# Patient Record
Sex: MALE | Race: OTHER | Employment: FULL TIME | URBAN - METROPOLITAN AREA
[De-identification: names, ages, dates, MRNs, and addresses within clinical notes are randomized per-mention and may not be internally consistent; named-entity substitution may affect disease eponyms.]

---

## 2017-11-20 ENCOUNTER — HOSPITAL ENCOUNTER (OUTPATIENT)
Dept: MRI IMAGING | Facility: HOSPITAL | Age: 42
Discharge: HOME | End: 2017-11-20
Attending: INTERNAL MEDICINE
Payer: COMMERCIAL

## 2017-11-20 DIAGNOSIS — R42 VERTIGO: ICD-10-CM

## 2017-11-20 DIAGNOSIS — R42 DIZZINESS: ICD-10-CM

## 2017-11-20 PROCEDURE — A9575 INJ GADOTERATE MEGLUMI 0.1ML: HCPCS

## 2017-11-20 PROCEDURE — 70553 MRI BRAIN STEM W/O & W/DYE: CPT | Performed by: INTERNAL MEDICINE

## 2019-04-09 ENCOUNTER — TELEPHONE (OUTPATIENT)
Dept: GASTROENTEROLOGY | Facility: CLINIC | Age: 44
End: 2019-04-09

## 2019-04-09 ENCOUNTER — OFFICE VISIT (OUTPATIENT)
Dept: GASTROENTEROLOGY | Facility: CLINIC | Age: 44
End: 2019-04-09
Payer: COMMERCIAL

## 2019-04-09 VITALS
HEIGHT: 70 IN | BODY MASS INDEX: 27.58 KG/M2 | DIASTOLIC BLOOD PRESSURE: 86 MMHG | SYSTOLIC BLOOD PRESSURE: 137 MMHG | WEIGHT: 192.63 LBS | HEART RATE: 90 BPM

## 2019-04-09 DIAGNOSIS — K21.9 GASTROESOPHAGEAL REFLUX DISEASE, ESOPHAGITIS PRESENCE NOT SPECIFIED: Primary | ICD-10-CM

## 2019-04-09 PROCEDURE — S0285 CNSLT BEFORE SCREEN COLONOSC: HCPCS | Performed by: INTERNAL MEDICINE

## 2019-04-09 RX ORDER — OMEPRAZOLE 20 MG/1
20 CAPSULE, DELAYED RELEASE ORAL EVERY MORNING
Qty: 30 CAPSULE | Refills: 2 | Status: SHIPPED | OUTPATIENT
Start: 2019-04-09 | End: 2019-07-06

## 2019-04-09 RX ORDER — ESOMEPRAZOLE MAGNESIUM 40 MG/1
FOR SUSPENSION ORAL
COMMUNITY
End: 2019-08-07 | Stop reason: ALTCHOICE

## 2019-04-09 RX ORDER — IBUPROFEN 800 MG/1
TABLET ORAL
Refills: 0 | COMMUNITY
Start: 2018-11-10

## 2019-04-09 RX ORDER — BIOTIN 1 MG
TABLET ORAL
COMMUNITY

## 2019-04-09 NOTE — PROGRESS NOTES
Elio Doherty is a 41year old male. HPI:   Patient presents with:  Consult: switching care for GERD     The patient is a 19-year-old male who presents with chronic reflux refractory.   The patient reports that he was diagnosed 5 years ago, he was terry Esomeprazole Magnesium 40 MG Oral Powd Pack  Disp:  Rfl:    Cholecalciferol (VITAMIN D3) 1000 units Oral Cap  Disp:  Rfl:    ibuprofen 800 MG Oral Tab 1 TABLET EVERY 8 HOURS FOR PAIN AS NEEDED Disp:  Rfl: 0       Allergies:  No Known Allergies      ROS: Signed Prescriptions Disp Refills   • omeprazole 20 MG Oral Capsule Delayed Release 30 capsule 2     Sig: Take 1 capsule (20 mg total) by mouth every morning. Imaging & Referrals:  None     4/9/2019  Loraine Powers.  Yenifer Amato MD

## 2019-04-09 NOTE — TELEPHONE ENCOUNTER
Scheduled for:  EGD 74945  Provider Name: Steven Garcia  Date:  6/10/19  Location:  [facility]  Sedation:  MAC  Time:  1130AM (pt is aware to arrive at 1030AM)  Prep:NPO after midnight  Meds/Allergies Reconciled?:  Physician reviewed  Diagnosis with codes:  GERD K21.9  W

## 2019-04-09 NOTE — PATIENT INSTRUCTIONS
GERD  - decrease omeprazole to 20mg a day  - EGD with MAC sedation  - dietary changes  - will likely have to gradually decrease medication to wean off

## 2019-04-12 NOTE — TELEPHONE ENCOUNTER
No prior authorization needed for EGD Per Fabby Bustillos from [address] [phone]  Ref #[id number]  Left detailed message on voicemail  that no prior auth is needed for procedure.   Information updated on referral.

## 2019-06-01 RX ORDER — SODIUM CHLORIDE, SODIUM LACTATE, POTASSIUM CHLORIDE, CALCIUM CHLORIDE 600; 310; 30; 20 MG/100ML; MG/100ML; MG/100ML; MG/100ML
INJECTION, SOLUTION INTRAVENOUS CONTINUOUS
Status: CANCELLED | OUTPATIENT
Start: 2019-06-01

## 2019-06-10 ENCOUNTER — ANESTHESIA EVENT (OUTPATIENT)
Dept: ENDOSCOPY | Facility: HOSPITAL | Age: 44
End: 2019-06-10
Payer: COMMERCIAL

## 2019-06-10 ENCOUNTER — HOSPITAL ENCOUNTER (OUTPATIENT)
Facility: HOSPITAL | Age: 44
Setting detail: HOSPITAL OUTPATIENT SURGERY
Discharge: HOME | End: 2019-06-10
Attending: INTERNAL MEDICINE | Admitting: INTERNAL MEDICINE
Payer: COMMERCIAL

## 2019-06-10 ENCOUNTER — ANESTHESIA (OUTPATIENT)
Dept: ENDOSCOPY | Facility: HOSPITAL | Age: 44
End: 2019-06-10
Payer: COMMERCIAL

## 2019-06-10 DIAGNOSIS — K21.9 GASTROESOPHAGEAL REFLUX DISEASE, ESOPHAGITIS PRESENCE NOT SPECIFIED: ICD-10-CM

## 2019-06-10 PROCEDURE — 0DB68ZX EXCISION OF STOMACH, VIA NATURAL OR ARTIFICIAL OPENING ENDOSCOPIC, DIAGNOSTIC: ICD-10-PCS | Performed by: INTERNAL MEDICINE

## 2019-06-10 PROCEDURE — 43239 EGD BIOPSY SINGLE/MULTIPLE: CPT | Performed by: INTERNAL MEDICINE

## 2019-06-10 RX ORDER — SODIUM CHLORIDE, SODIUM LACTATE, POTASSIUM CHLORIDE, CALCIUM CHLORIDE 600; 310; 30; 20 MG/100ML; MG/100ML; MG/100ML; MG/100ML
INJECTION, SOLUTION INTRAVENOUS CONTINUOUS PRN
Status: DISCONTINUED | OUTPATIENT
Start: 2019-06-10 | End: 2019-06-10 | Stop reason: SURG

## 2019-06-10 RX ORDER — MIDAZOLAM HYDROCHLORIDE 1 MG/ML
INJECTION INTRAMUSCULAR; INTRAVENOUS AS NEEDED
Status: DISCONTINUED | OUTPATIENT
Start: 2019-06-10 | End: 2019-06-10 | Stop reason: SURG

## 2019-06-10 RX ADMIN — MIDAZOLAM HYDROCHLORIDE 2 MG: 1 INJECTION INTRAMUSCULAR; INTRAVENOUS at 11:19:00

## 2019-06-10 RX ADMIN — SODIUM CHLORIDE, SODIUM LACTATE, POTASSIUM CHLORIDE, CALCIUM CHLORIDE: 600; 310; 30; 20 INJECTION, SOLUTION INTRAVENOUS at 11:24:00

## 2019-06-10 RX ADMIN — SODIUM CHLORIDE, SODIUM LACTATE, POTASSIUM CHLORIDE, CALCIUM CHLORIDE: 600; 310; 30; 20 INJECTION, SOLUTION INTRAVENOUS at 11:16:00

## 2019-06-10 NOTE — OPERATIVE REPORT
[facility] HOSP - [facility] Endoscopy Report      Preoperative Diagnosis:  - GERD      Postoperative Diagnosis:  - small hiatal hernia  - gastric polyps ( fundic gland)      Procedure:    Esophagogastroduodenoscopy       Surgeon:  Sylvia Barreto M.D.     Ane

## 2019-06-10 NOTE — H&P
History & Physical Examination    Patient Name: Davy Schrader  MRN: [id number]  CSN: [id number]  YOB: 1975    Diagnosis: GERD    Medications Prior to Admission:  Esomeprazole Magnesium 40 MG Oral Powd Pack  Disp:  Rfl:  6/9/2019   Cholecalci

## 2019-06-10 NOTE — ANESTHESIA POSTPROCEDURE EVALUATION
Patient: Jacob Leigh    Procedure Summary     Date:  06/10/19 Room / Location:  [facility] ENDOSCOPY 01 / [facility] ENDOSCOPY    Anesthesia Start:  1304 Anesthesia Stop:  1947    Procedure:  ESOPHAGOGASTRODUODENOSCOPY (EGD) (N/A ) Diagnosis:       Gastroesophageal r

## 2019-06-10 NOTE — ANESTHESIA PREPROCEDURE EVALUATION
Anesthesia PreOp Note    HPI:     Mary Bennett is a 41year old male who presents for preoperative consultation requested by: Drake Siemens, MD    Date of Surgery: 6/10/2019    Procedure(s):  ESOPHAGOGASTRODUODENOSCOPY (EGD)  Indication: Tammi Dos Santos file        Non-medical: Not on file    Tobacco Use      Smoking status: Light Tobacco Smoker      Smokeless tobacco: Never Used    Substance and Sexual Activity      Alcohol use: Yes        Comment: on occ       Drug use: Not on file      Sexual activity: Christoph Bernal and/or legal guardian or family member of the nature of the anesthetic plan, benefits, risks including possible dental damage if relevant, major complications, and any alternative forms of anesthetic management.    All of the patient's Hasmukh Whittington

## 2019-06-11 VITALS
HEIGHT: 69 IN | BODY MASS INDEX: 28.88 KG/M2 | DIASTOLIC BLOOD PRESSURE: 94 MMHG | SYSTOLIC BLOOD PRESSURE: 125 MMHG | OXYGEN SATURATION: 98 % | HEART RATE: 74 BPM | WEIGHT: 195 LBS | RESPIRATION RATE: 14 BRPM

## 2019-06-18 ENCOUNTER — TELEPHONE (OUTPATIENT)
Dept: GASTROENTEROLOGY | Facility: CLINIC | Age: 44
End: 2019-06-18

## 2019-06-18 NOTE — TELEPHONE ENCOUNTER
----- Message from Maria Luisa Wang MD sent at 6/10/2019  5:55 PM CDT -----  The upper endoscopy biopsy results are benign ( gastric polyps )      Follow up with Bryant Sawant

## 2019-06-19 NOTE — TELEPHONE ENCOUNTER
Pt transferred from phone room and given results. Accepted appt with [facility] for Wed Aug 7, instructed to arrive by 3:15pm --is at work, so asked that I send Check-Capt message with directions to [facility]. This was done.

## 2019-07-09 RX ORDER — OMEPRAZOLE 20 MG/1
CAPSULE, DELAYED RELEASE ORAL
Qty: 30 CAPSULE | Refills: 3 | Status: SHIPPED | OUTPATIENT
Start: 2019-07-09

## 2019-07-31 NOTE — PROGRESS NOTES
[facility] Follow-up Visit    Susanne Former smoker  + Occasional EtOH  - Denies illicit drug use   - Occupation: Construction  - Lives with spouse  - NSAIDs/ASA use: Ibuprofen 800 mg as needed      History, Medications, Allergies, ROS:      Past Medical History:   Diagnosis Date   • GERD (gas anicteric, oropharynx clear, mucus membranes appear moist  CV: regular rate and rhythm, the extremities are warm and well perfused   Lung: effort normal and breath sounds normal, no respiratory distress, wheezes or rales  GI: soft, non-tender exam in all q Visit:  Requested Prescriptions      No prescriptions requested or ordered in this encounter       Imaging & Referrals:  None       JOSHUA Silveira  8/7/2019

## 2019-08-07 ENCOUNTER — OFFICE VISIT (OUTPATIENT)
Dept: GASTROENTEROLOGY | Facility: CLINIC | Age: 44
End: 2019-08-07
Payer: COMMERCIAL

## 2019-08-07 VITALS
SYSTOLIC BLOOD PRESSURE: 120 MMHG | HEIGHT: 70 IN | RESPIRATION RATE: 20 BRPM | DIASTOLIC BLOOD PRESSURE: 73 MMHG | BODY MASS INDEX: 27.92 KG/M2 | WEIGHT: 195 LBS | HEART RATE: 79 BPM

## 2019-08-07 DIAGNOSIS — K44.9 HIATAL HERNIA WITH GERD: Primary | ICD-10-CM

## 2019-08-07 DIAGNOSIS — K21.9 HIATAL HERNIA WITH GERD: Primary | ICD-10-CM

## 2019-08-07 PROCEDURE — 99214 OFFICE O/P EST MOD 30 MIN: CPT | Performed by: NURSE PRACTITIONER

## 2019-10-31 RX ORDER — OMEPRAZOLE 20 MG/1
CAPSULE, DELAYED RELEASE ORAL
Qty: 30 CAPSULE | Refills: 5 | Status: SHIPPED | OUTPATIENT
Start: 2019-10-31

## 2023-07-13 ENCOUNTER — OFFICE VISIT (OUTPATIENT)
Facility: CLINIC | Age: 48
End: 2023-07-13

## 2023-07-13 ENCOUNTER — TELEPHONE (OUTPATIENT)
Facility: CLINIC | Age: 48
End: 2023-07-13

## 2023-07-13 VITALS
BODY MASS INDEX: 28.02 KG/M2 | DIASTOLIC BLOOD PRESSURE: 93 MMHG | HEART RATE: 70 BPM | HEIGHT: 69 IN | SYSTOLIC BLOOD PRESSURE: 138 MMHG | WEIGHT: 189.19 LBS

## 2023-07-13 DIAGNOSIS — Z12.11 COLON CANCER SCREENING: ICD-10-CM

## 2023-07-13 DIAGNOSIS — K44.9 HIATAL HERNIA: Primary | ICD-10-CM

## 2023-07-13 DIAGNOSIS — K21.9 GASTROESOPHAGEAL REFLUX DISEASE, UNSPECIFIED WHETHER ESOPHAGITIS PRESENT: ICD-10-CM

## 2023-07-13 DIAGNOSIS — K21.9 GASTROESOPHAGEAL REFLUX DISEASE, UNSPECIFIED WHETHER ESOPHAGITIS PRESENT: Primary | ICD-10-CM

## 2023-07-13 DIAGNOSIS — K44.9 HIATAL HERNIA: ICD-10-CM

## 2023-07-13 PROCEDURE — 3080F DIAST BP >= 90 MM HG: CPT | Performed by: INTERNAL MEDICINE

## 2023-07-13 PROCEDURE — 3008F BODY MASS INDEX DOCD: CPT | Performed by: INTERNAL MEDICINE

## 2023-07-13 PROCEDURE — 3075F SYST BP GE 130 - 139MM HG: CPT | Performed by: INTERNAL MEDICINE

## 2023-07-13 PROCEDURE — 99204 OFFICE O/P NEW MOD 45 MIN: CPT | Performed by: INTERNAL MEDICINE

## 2023-07-13 NOTE — PROGRESS NOTES
Princess Guillermo is a 41year old male. HPI:   Patient presents with:  Colonoscopy Screening: gerd    The patient is a 30-year-old who has a history of chronic reflux, hiatal hernia who presents for ongoing reflux symptoms colon cancer screening. He has undergone prior EGD in 2019, small hiatal hernia and gastric /fundic polyps noted and has been on and off of antiacid therapy. He has worked with dietary changes. He denies any dysphagia but is frustrated by chronic reflux symptoms. He  denies any abdominal pain, change in bowel habits or blood per rectum. He denies any family history of colon cancer. The patient denies any issues with sedation, he has no chest pains, no shortness of breath. HISTORY:  Past Medical History:   Diagnosis Date    GERD (gastroesophageal reflux disease)       Past Surgical History:   Procedure Laterality Date    EGD      last EGD 3 years ago    HERNIA SURGERY      OTHER      shoulder surgery    UPPER GI ENDOSCOPY,EXAM        No family history on file. Social History:   Social History     Socioeconomic History    Marital status:    Tobacco Use    Smoking status: Light Smoker    Smokeless tobacco: Never   Substance and Sexual Activity    Alcohol use: Yes     Comment: on occ     Drug use: Never        Medications (Active prior to today's visit):  Current Outpatient Medications   Medication Sig Dispense Refill    OMEPRAZOLE 20 MG Oral Capsule Delayed Release TAKE 1 CAPSULE(20 MG) BY MOUTH EVERY MORNING 30 capsule 5    OMEPRAZOLE 20 MG Oral Capsule Delayed Release TAKE 1 CAPSULE(20 MG) BY MOUTH EVERY MORNING 30 capsule 3    Cholecalciferol (VITAMIN D3) 1000 units Oral Cap       ibuprofen 800 MG Oral Tab 1 TABLET EVERY 8 HOURS FOR PAIN AS NEEDED  0       Allergies:  No Known Allergies      ROS:   The patient denies any chest pain or shortness of breath,  No neurologic or dermatologic symptoms. PHYSICAL EXAM:   There were no vitals taken for this visit.     The patient appears their stated age and is in no acute distress  HEENT- anicteric sclera, neck no lymphadnopathy, OP- clear with no masses or lesions  Chest- Clear bilaterally, no wheezing,  Heart- regular rate, no murmur or gallop  Abdomen- Soft and nontender, nondistended  Ext- no clubbing or cyanosis  Skin- no rashes or lesions  Neuro- appropriate response, alert, no confusion  . ASSESSMENT/PLAN:   Assessment     The patient is a 44-year-old male who presents with a history of chronic reflux symptoms which been ongoing and refractory to conventional therapy. He is try dietary adjustments and behavioral changes/medication treatment with PPI. Plan EGD. Additionally patient is due for colon cancer screening. Discussed screening options and agrees to proceed with colonoscopy. Plan to set up colonoscopy and EGD at the same time. Risks benefits and alternatives reviewed and agrees to proceed. GoLytely bowel preparation and MAC sedation. He  continue with dietary adjustments and behavioral changes, given his hiatal hernia I have advised no eating before bed. PPI therapy as discussed. Plan  Colonoscopy for colon cancer screening  - Golytely prep   - MAC     GERD/hiatal hernia  - EGD with the colonoscopy   - continue on the omeprazole     EGD/Colonoscopy consent: I have discussed the risks, benefits, and alternatives to EGD/colonoscopy with the patient [who demonstrated understanding], including but not limited to the risks of bleeding, infection, pain, death, as well as the risks of anesthesia and perforation all leading to prolonged hospitalization, surgical intervention, or even death. I also specifically mentioned the miss rate of colonoscopy of 5-10% in the best of all circumstances. All questions were answered to the patient's satisfaction. The patient signed informed consent and elected to proceed with colonoscopy with intervention [i.e. polypectomy, stent placement, etc.] as indicated.            Orders This Visit:  No orders of the defined types were placed in this encounter.       Meds This Visit:  Requested Prescriptions      No prescriptions requested or ordered in this encounter       Imaging & Referrals:  None       Paolo Hammonds, [address] [place] [address] Gastroenterology

## 2023-07-13 NOTE — PATIENT INSTRUCTIONS
Colonoscopy for colon cancer screening  - Golytely prep   - MAC     GERD/hiatal hernia  - EGD with the colonoscopy   - continue on the omeprazole

## 2023-07-14 NOTE — TELEPHONE ENCOUNTER
Scheduled for:  Colonoscopy [phone] , [address] [facility] ,Jennifer Burris [address]   Provider Name:    Date:  11/17/23  Location:  [facility]  Sedation:  Mac  Time:  10:45 Am (pt is aware to arrive at 0945 Am)   Prep:  Colyte ,Egd ,Prep instructions were given to pt in the office, I discuss prep Instructions with patient at the time of the appointment which he verbally understood and given the prep instructions at the time of the appointment  Meds/Allergies Reconciled?:  Physician reviewed     Diagnosis with codes:  Colon cancer screening Z12.11,Gerd K21.9 , Hiatal Hernia K44.9  Was patient informed to call insurance with codes (Y/N):  Yes, I confirmed [employer] insurance with the patient. The patient also verbally understands to call his insurance to check for pre-cert, codes were given on prep instructions. Referral sent?:  Referral was sent at the time of electronic surgical scheduling. [facility] or [phone]  notified?:  I sent an electronic request to Endo Scheduling and received a confirmation today. Medication Orders: This patient verbally confirmed that he is not taking:   Iron, blood thinners, BP meds, and is not diabetic   Not latex allergy, Not PCN allergy and does not have a pacemaker Pt is aware to NOT take iron pills, herbal meds and diet supplements for 7 days before exam. Also to NOT take any form of alcohol, recreational drugs and any forms of ED meds 24 hours before exam.    Misc Orders:  Patient verbally understood & will await a phone call from [facility] to schedule.       Further instructions given by staff:

## 2023-07-20 ENCOUNTER — TELEPHONE (OUTPATIENT)
Facility: CLINIC | Age: 48
End: 2023-07-20

## 2023-11-16 ENCOUNTER — TELEPHONE (OUTPATIENT)
Facility: CLINIC | Age: 48
End: 2023-11-16

## 2023-11-16 NOTE — TELEPHONE ENCOUNTER
Dr. Shea Fears    Patient had a small amount of cereal and an apple this afternoon. Besides that only had a sandwich and a banana at 10am  I told him ok to proceed as planned as long as he follows instructions going forward. I reviewed instructions with him in detail over the phone and sent a new copy to his MyChart.     Thank you

## 2023-11-17 ENCOUNTER — ANESTHESIA EVENT (OUTPATIENT)
Dept: ENDOSCOPY | Age: 48
End: 2023-11-17
Payer: COMMERCIAL

## 2023-11-17 ENCOUNTER — HOSPITAL ENCOUNTER (OUTPATIENT)
Age: 48
Setting detail: HOSPITAL OUTPATIENT SURGERY
Discharge: HOME | End: 2023-11-17
Attending: INTERNAL MEDICINE | Admitting: INTERNAL MEDICINE
Payer: COMMERCIAL

## 2023-11-17 ENCOUNTER — ANESTHESIA (OUTPATIENT)
Dept: ENDOSCOPY | Age: 48
End: 2023-11-17
Payer: COMMERCIAL

## 2023-11-17 VITALS
DIASTOLIC BLOOD PRESSURE: 75 MMHG | OXYGEN SATURATION: 98 % | BODY MASS INDEX: 25.2 KG/M2 | SYSTOLIC BLOOD PRESSURE: 110 MMHG | HEIGHT: 71 IN | WEIGHT: 180 LBS | HEART RATE: 53 BPM | RESPIRATION RATE: 10 BRPM

## 2023-11-17 DIAGNOSIS — K21.9 GASTROESOPHAGEAL REFLUX DISEASE, UNSPECIFIED WHETHER ESOPHAGITIS PRESENT: ICD-10-CM

## 2023-11-17 DIAGNOSIS — K44.9 HIATAL HERNIA: ICD-10-CM

## 2023-11-17 DIAGNOSIS — Z12.11 COLON CANCER SCREENING: ICD-10-CM

## 2023-11-17 PROCEDURE — 88312 SPECIAL STAINS GROUP 1: CPT | Performed by: INTERNAL MEDICINE

## 2023-11-17 PROCEDURE — 99070 SPECIAL SUPPLIES PHYS/QHP: CPT | Performed by: INTERNAL MEDICINE

## 2023-11-17 PROCEDURE — 88305 TISSUE EXAM BY PATHOLOGIST: CPT | Performed by: INTERNAL MEDICINE

## 2023-11-17 PROCEDURE — 43239 EGD BIOPSY SINGLE/MULTIPLE: CPT | Performed by: INTERNAL MEDICINE

## 2023-11-17 PROCEDURE — 45385 COLONOSCOPY W/LESION REMOVAL: CPT | Performed by: INTERNAL MEDICINE

## 2023-11-17 RX ORDER — SODIUM CHLORIDE, SODIUM LACTATE, POTASSIUM CHLORIDE, CALCIUM CHLORIDE 600; 310; 30; 20 MG/100ML; MG/100ML; MG/100ML; MG/100ML
INJECTION, SOLUTION INTRAVENOUS CONTINUOUS
OUTPATIENT
Start: 2023-11-17

## 2023-11-17 RX ORDER — SODIUM CHLORIDE, SODIUM LACTATE, POTASSIUM CHLORIDE, CALCIUM CHLORIDE 600; 310; 30; 20 MG/100ML; MG/100ML; MG/100ML; MG/100ML
INJECTION, SOLUTION INTRAVENOUS CONTINUOUS
Status: DISCONTINUED | OUTPATIENT
Start: 2023-11-17 | End: 2023-11-17

## 2023-11-17 RX ORDER — LIDOCAINE HYDROCHLORIDE 10 MG/ML
INJECTION, SOLUTION EPIDURAL; INFILTRATION; INTRACAUDAL; PERINEURAL AS NEEDED
Status: DISCONTINUED | OUTPATIENT
Start: 2023-11-17 | End: 2023-11-17 | Stop reason: SURG

## 2023-11-17 RX ORDER — NALOXONE HYDROCHLORIDE 0.4 MG/ML
0.08 INJECTION, SOLUTION INTRAMUSCULAR; INTRAVENOUS; SUBCUTANEOUS ONCE AS NEEDED
OUTPATIENT
Start: 2023-11-17 | End: 2023-11-17

## 2023-11-17 RX ADMIN — SODIUM CHLORIDE, SODIUM LACTATE, POTASSIUM CHLORIDE, CALCIUM CHLORIDE: 600; 310; 30; 20 INJECTION, SOLUTION INTRAVENOUS at 11:14:00

## 2023-11-17 RX ADMIN — LIDOCAINE HYDROCHLORIDE 50 MG: 10 INJECTION, SOLUTION EPIDURAL; INFILTRATION; INTRACAUDAL; PERINEURAL at 11:15:00

## 2023-11-17 RX ADMIN — SODIUM CHLORIDE, SODIUM LACTATE, POTASSIUM CHLORIDE, CALCIUM CHLORIDE: 600; 310; 30; 20 INJECTION, SOLUTION INTRAVENOUS at 11:46:00

## 2023-11-17 NOTE — DISCHARGE INSTRUCTIONS

## 2023-11-17 NOTE — H&P
History & Physical Examination    Patient Name: Purvi Officer  MRN: [id number]  [facility]: [id number]  YOB: 1975    Diagnosis:   CRC screening  GERD  Hiatal hernia    Medications Prior to Admission   Medication Sig Dispense Refill Last Dose    LOSARTAN POTASSIUM OR Take by mouth. OMEPRAZOLE 20 MG Oral Capsule Delayed Release TAKE 1 CAPSULE(20 MG) BY MOUTH EVERY MORNING 30 capsule 5 11/17/2023    OMEPRAZOLE 20 MG Oral Capsule Delayed Release TAKE 1 CAPSULE(20 MG) BY MOUTH EVERY MORNING 30 capsule 3 11/17/2023    ibuprofen 800 MG Oral Tab 1 TABLET EVERY 8 HOURS FOR PAIN AS NEEDED  0     Cholecalciferol (VITAMIN D3) 1000 units Oral Cap         Current Facility-Administered Medications   Medication Dose Route Frequency    lactated ringers infusion   Intravenous Continuous       Allergies: Allergies   Allergen Reactions    Latex      Added based on information entered during log entry, please review and add reactions, type, and severity as needed       Past Medical History:   Diagnosis Date    GERD (gastroesophageal reflux disease)     High blood pressure      Past Surgical History:   Procedure Laterality Date    EGD      last EGD 3 years ago    HERNIA SURGERY      OTHER      shoulder surgery    UPPER GI ENDOSCOPY,EXAM       History reviewed. No pertinent family history. Social History     Tobacco Use    Smoking status: Former     Types: Cigarettes    Smokeless tobacco: Never    Tobacco comments:     QUIT 5 YRS AGO   Substance Use Topics    Alcohol use: Yes     Comment: on occ        SYSTEM Check if Review is Normal Check if Physical Exam is Normal If not normal, please explain:   HEENT [x ] [ x]    NECK & BACK [x ] [x ]    HEART [x ] [ x]    LUNGS [x ] [ x]    ABDOMEN [x ] [x ]    UROGENITAL [ ] [ ]    EXTREMITIES [x ] [x ]    OTHER        [ x ] I have discussed the risks and benefits and alternatives with the patient/family. They understand and agree to proceed with plan of care.   [ x ] I have reviewed the History and Physical done within the last 30 days. Any changes noted above. Ashley Handler.  Thomas Robert MD  11/17/2023  11:05 AM

## 2023-11-17 NOTE — OPERATIVE REPORT
[facility] Endoscopy Report  Date of procedure-November 17, 2023    Preoperative Diagnosis:  -Colorectal cancer screening  -GERD/hiatal hernia      Postoperative Diagnosis:  -Colon polyp x1  -Diverticular disease  -Small internal hemorrhoids  -Small hiatal hernia  -Gastric polyps      Procedure:    Colonoscopy   Esophagogastroduodenoscopy       Surgeon:  Celine Mares M.D. Anesthesia:  MAC     Technique:  After informed consent, the patient was placed in the left lateral recumbent position. Digital rectal examination revealed no palpable intraluminal abnormalities. An Olympus variable stiffness 190 series HD colonoscope was inserted into the rectum and advanced under direct vision by following the lumen to the cecum. The colon was examined upon withdrawal in the left lateral position. Following colonoscopy, an Olympus adult HD gastroscope was inserted into the hypopharynx and advanced under direct vision into the esophagus, stomach and duodenum. The endoscope was withdrawn to the stomach where retroflexion of the annulus, body, cardia and fundus was performed. The instrument was straightened, insufflated air and fluid were suctioned and the endoscope was withdrawn. The procedures were well tolerated without immediate complication. Findings:  The preparation of the colon was good. The terminal ileum was examined for 4 cm and visually normal.  The ileocecal valve was well preserved. The visualized colonic mucosa from the cecum to the anal verge was normal with an intact vascular pattern. Sigmoid colon polyp x1, this was sessile approximately 4 mm in size and cold snare removed. No bleeding noted at the polypectomy site and specimens retrieved and sent for analysis. Diverticular disease located in the sigmoid colon, no diverticulitis. Small internal hemorrhoids noted on retroflexed view. The esophagus was normal.  Biopsies taken at the GE junction.   The GE junction and diaphragmatic impression were at 39 cm and 40 cm for 1 cm sliding-type hiatal hernia. The stomach distended appropriately with insufflated air. The mucosa of the stomach including cardia, fundus, gastric body and antrum were normal.  Multiple gastric polyps with fundic gland appearance were noted throughout the body and fundic region of the stomach, several these were on the larger side approximately 1.5 cm, biopsies taken. The duodenal bulb and post bulbar regions were normal.    Estimated blood loss-insignificant  Specimens-see above    Impression:  -Colon polyp x1  -Diverticular disease  -Small internal hemorrhoids  -Small hiatal hernia  -Gastric polyps    Recommendations:  - Post polypectomy instructions given  - Repeat colonoscopy in 7- 10 years  - High fiber diet for diverticular disease  - Symptomatic treatment of hemorrhoids  - Follow up on upper GI biopsies          Elisabeth Warren.  Bret Kenney MD  11/17/2023  11:51 AM

## 2023-11-17 NOTE — ANESTHESIA POSTPROCEDURE EVALUATION
Patient: Kan Dotson    Procedure Summary       Date: 11/17/23 Room / Location: [facility] ENDOSCOPY 01 / [facility] ENDO    Anesthesia Start: 1114 Anesthesia Stop: 9304    Procedures:       COLONOSCOPY      ESOPHAGOGASTRODUODENOSCOPY (EGD) Diagnosis:       Hiatal hernia      Gastroesophageal reflux disease, unspecified whether esophagitis present      Colon cancer screening      (polyp, diverticulosis, hemorrhoids, gastric polyp, small hiatal hernia)    Surgeons: Jenise Rodriguez MD Anesthesiologist: Esperanza Gu DO    Anesthesia Type: MAC ASA Status: 2            Anesthesia Type: MAC    Vitals Value Taken Time   /72 11/17/23 1149   Temp  11/17/23 1152   Pulse 65 11/17/23 1149   Resp 16 11/17/23 1149   SpO2 98 % 11/17/23 1149       [facility] AN Post Evaluation:   Patient Evaluated in PACU  Patient Participation: complete - patient participated  Level of Consciousness: awake  Pain Management: adequate  Airway Patency:patent  Dental exam unchanged from preop  Yes    Cardiovascular Status: acceptable  Respiratory Status: acceptable  Postoperative Hydration acceptable      JAQUELINE HANNA DO  11/17/2023 11:52 AM

## 2023-12-06 ENCOUNTER — TELEPHONE (OUTPATIENT)
Facility: CLINIC | Age: 48
End: 2023-12-06

## 2023-12-06 NOTE — TELEPHONE ENCOUNTER
----- Message from Monty Barrientos MD sent at 12/5/2023  5:39 PM CST -----  I wanted to get back to you with your colonoscopy and EGD results. You had one colon polyp removed which was benign. I would advise a repeat colonoscopy in 7 years to make sure no new polyps are forming. You also have internal hemorrhoids and diverticulosis. The upper endoscopy showed some stomach polyps, biopsies were benign.

## 2023-12-06 NOTE — TELEPHONE ENCOUNTER
Called patient, verified name and . Went over results, pt informed to increase fiber intake for diverticulosis. Colon recall entered for 7 years per Dr. Rin Chu. Colonoscopy done on 23. Health maintenance and pt outreach updated.

## 2025-05-17 ENCOUNTER — OFFICE VISIT (OUTPATIENT)
Dept: OTOLARYNGOLOGY | Facility: CLINIC | Age: 50
End: 2025-05-17
Payer: COMMERCIAL

## 2025-05-17 VITALS — BODY MASS INDEX: 26.97 KG/M2 | HEIGHT: 71 IN | WEIGHT: 192.63 LBS

## 2025-05-17 DIAGNOSIS — R07.0 THROAT PAIN: Primary | ICD-10-CM

## 2025-05-17 RX ORDER — AZELASTINE 1 MG/ML
2 SPRAY, METERED NASAL 2 TIMES DAILY
Qty: 30 ML | Refills: 3 | Status: SHIPPED | OUTPATIENT
Start: 2025-05-17

## 2025-05-17 RX ORDER — VALSARTAN 80 MG/1
80 TABLET ORAL DAILY
COMMUNITY

## 2025-05-17 RX ORDER — ERGOCALCIFEROL 1.25 MG/1
50000 CAPSULE, LIQUID FILLED ORAL WEEKLY
COMMUNITY
Start: 2025-04-30

## 2025-05-17 RX ORDER — MONTELUKAST SODIUM 10 MG/1
10 TABLET ORAL NIGHTLY
Qty: 30 TABLET | Refills: 3 | Status: SHIPPED | OUTPATIENT
Start: 2025-05-17

## 2025-05-17 NOTE — PROGRESS NOTES
Carlitos Lewis is a 49 year old male.    Chief Complaint   Patient presents with    New Patient    Throat Problem     Patient reports burning sensation in throat x3 months.         HISTORY OF PRESENT ILLNESS  He presents with 3-month history of burning sensation in the middle of his throat at the level of his larynx.  He was in [place] at that time working and states that he was under immense stress.  Had a very poor diet as he was eating things that he normally does not eat and a lot of fried foods and spicy foods and he does note that he had a worsening of his reflux during that time.  Since he has returned to the [place] he has been doing better.  He does have a significant history of having an EGD performed by Dr. Bonilla back in 2023 demonstrating gastritis and esophagitis.  He does note that when he bends over that he gets a sensation of acid moving into his chest and sometimes when he is lying completely flat may feel the same.  Currently on omeprazole 20 mg daily.  Admits to some nasal congestion but denies any other significant symptoms other than throat clearing denies cough.  He does admit to sometimes having some sinus pressure in the forehead and was told at one point that he has sinusitis.  Currently on no allergy meds.      Social Hx on file[1]    Family History[2]    Past Medical History[3]    Past Surgical History[4]      REVIEW OF SYSTEMS    System Neg/Pos Details   Constitutional Negative Fatigue, fever and weight loss.   ENMT Negative Drooling.   Eyes Negative Blurred vision and vision changes.   Respiratory Negative Dyspnea and wheezing.   Cardio Negative Chest pain, irregular heartbeat/palpitations and syncope.   GI Negative Abdominal pain and diarrhea.   Endocrine Negative Cold intolerance and heat intolerance.   Neuro Negative Tremors.   Psych Negative Anxiety and depression.   Integumentary Negative Frequent skin infections, pigment change and rash.   Hema/Lymph Negative Easy bleeding and easy  bruising.           PHYSICAL EXAM     5' 11\" (1.803 m)   Wt 192 lb 9.6 oz (87.4 kg)   BMI 26.86 kg/m²        Constitutional Normal Overall appearance - Normal.   Psychiatric Normal Orientation - Oriented to time, place, person & situation. Appropriate mood and affect.   Neck Exam Normal Inspection - Normal. Palpation - Normal. Parotid gland - Normal. Thyroid gland - Normal.   Eyes Normal Conjunctiva - Right: Normal, Left: Normal. Pupil - Right: Normal, Left: Normal. Fundus - Right: Normal, Left: Normal.   Neurological Normal Memory - Normal. Cranial nerves - Cranial nerves II through XII grossly intact.   Head/Face Normal Facial features - Normal. Eyebrows - Normal. Skull - Normal.        Nasopharynx Normal External nose - Normal. Lips/teeth/gums - Normal. Tonsils - Normal. Oropharynx - Normal.   Ears Normal Inspection - Right: Normal, Left: Normal. Canal - Right: Normal, Left: Normal. TM - Right: Normal, Left: Normal.   Skin Normal Inspection - Normal.        Lymph Detail Normal Submental. Submandibular. Anterior cervical. Posterior cervical. Supraclavicular.        Nose/Mouth/Throat Normal External nose - Normal. Lips/teeth/gums - Normal. Tonsils - Normal. Oropharynx -nasal discharge with erythema   Nose/Mouth/Throat Normal Nares - Right: Normal Left: Normal. Septum -Normal  Turbinates - Right: Normal, Left: Normal.  Nasal mucosa congested   Procedures:  Endoscopy/Laryngoscopy  Pre-Procedure Care: Verbal consent was obtained. Procedure/risks were explained. Questions were answered. Correct patient identified. Correct side and site confirmed.      A topical spray of ).25% Neosynephrine was sprayed into the nose.    Laryngoscopy:  Flexible Fiberoptic Laryngoscopy: A diagnostic flexible fiberoptic laryngoscopy was performed. The flexible fiberoptic laryngoscope was placed into the nose or mouthand advanced  into the interior of the larynx. A thorough examination of the interior of the larynx was performed.    Findings were as follows.       Hypopharynx/Larynx:  Epiglottis is normal.  Arytenoids:  Bilateral: Arytenoids are normal.  Vocal folds-false  Bilateral: Vocal folds (false) are normal.  Vocal folds-true  Bilateral: Vocal folds (true) are normal.  Pyriform sinus:  Bilateral: Pyriform sinuses are normal.  Base of tongue is normal in appearance.  There is no airway obstruction.   General comments: Erythema of the laryngeal structures no lesions masses polyps nodules or other abnormalities.  Normal vocal cord motion normal introitus of the esophagus with mild erythema.            Medications - Current[5]  ASSESSMENT AND PLAN    1. Throat pain  Laryngoscopy reveals significant erythema of the laryngeal mucosa but no lesions masses polyps nodules or other issues.  GERD and postnasal discharge is the major cause of his throat symptoms.  I have asked him to double his omeprazole to 20 mg twice daily as he does continue to have symptoms occasionally with certain diets and when he is lying flat.  In addition I will start him on Claritin-D Singulair Astelin nasal spray for postnasal discharge noted on examination.  Return to see me in 1 month and at that time we will wean his meds as tolerated.  I did reassure him that no lesions or other abnormalities are present.  - LARYNGOSCOPY,FLEX FIBER,DIAGNOSTIC  - loratadine-pseudoephedrine ER 5-120 MG Oral Tablet 12 Hr; Take 1 tablet by mouth every 12 (twelve) hours.  Dispense: 60 tablet; Refill: 3        This note was prepared using Dragon Medical voice recognition dictation software. As a result errors may occur. When identified these errors have been corrected. While every attempt is made to correct errors during dictation discrepancies may still exist    Gary Hardwick MD    5/17/2025    7:35 AM         [1]   Social History  Socioeconomic History    Marital status:    Tobacco Use    Smoking status: Former     Types: Cigarettes    Smokeless tobacco: Never    Tobacco  comments:     QUIT 5 YRS AGO   Vaping Use    Vaping status: Never Used   Substance and Sexual Activity    Alcohol use: Yes     Comment: on occ     Drug use: Never   [2] History reviewed. No pertinent family history.  [3]   Past Medical History:   GERD (gastroesophageal reflux disease)    High blood pressure   [4]   Past Surgical History:  Procedure Laterality Date    Colonoscopy N/A 11/17/2023    Procedure: COLONOSCOPY;  Surgeon: uDc Bonilla MD;  Location: [facility] ENDO    Egd      last EGD 3 years ago    Hernia surgery      Other      shoulder surgery    Upper gi endoscopy,exam     [5]   Current Outpatient Medications:     ergocalciferol 1.25 MG (97321 UT) Oral Cap, Take 1 capsule (50,000 Units total) by mouth once a week., Disp: , Rfl:     valsartan 80 MG Oral Tab, Take 1 tablet (80 mg total) by mouth daily., Disp: , Rfl:     montelukast 10 MG Oral Tab, Take 1 tablet (10 mg total) by mouth nightly., Disp: 30 tablet, Rfl: 3    loratadine-pseudoephedrine ER 5-120 MG Oral Tablet 12 Hr, Take 1 tablet by mouth every 12 (twelve) hours., Disp: 60 tablet, Rfl: 3    azelastine 0.1 % Nasal Solution, 2 sprays by Nasal route 2 (two) times daily., Disp: 30 mL, Rfl: 3    LOSARTAN POTASSIUM OR, Take by mouth., Disp: , Rfl:     OMEPRAZOLE 20 MG Oral Capsule Delayed Release, TAKE 1 CAPSULE(20 MG) BY MOUTH EVERY MORNING, Disp: 30 capsule, Rfl: 5    OMEPRAZOLE 20 MG Oral Capsule Delayed Release, TAKE 1 CAPSULE(20 MG) BY MOUTH EVERY MORNING, Disp: 30 capsule, Rfl: 3    Cholecalciferol (VITAMIN D3) 1000 units Oral Cap, , Disp: , Rfl:     ibuprofen 800 MG Oral Tab, 1 TABLET EVERY 8 HOURS FOR PAIN AS NEEDED, Disp: , Rfl: 0

## 2025-06-07 ENCOUNTER — OFFICE VISIT (OUTPATIENT)
Dept: OTOLARYNGOLOGY | Facility: CLINIC | Age: 50
End: 2025-06-07
Payer: COMMERCIAL

## 2025-06-07 DIAGNOSIS — R07.0 THROAT PAIN: Primary | ICD-10-CM

## 2025-06-07 PROCEDURE — 99213 OFFICE O/P EST LOW 20 MIN: CPT | Performed by: OTOLARYNGOLOGY

## 2025-06-07 NOTE — PROGRESS NOTES
Carlitos Lewis is a 49 year old male.    Chief Complaint   Patient presents with    Follow - Up     F/up throat pain  Pt reports improvement since last visit       HISTORY OF PRESENT ILLNESS  He presents with 3-month history of burning sensation in the middle of his throat at the level of his larynx. He was in [place] at that time working and states that he was under immense stress. Had a very poor diet as he was eating things that he normally does not eat and a lot of fried foods and spicy foods and he does note that he had a worsening of his reflux during that time. [place] he has been doing better. He does have a significant history of having an EGD performed by Dr. Bonilla back in 2023 demonstrating gastritis and esophagitis. He does note that when he bends over that he gets a sensation of acid moving into his chest and sometimes when he is lying completely flat may feel the same. Currently on omeprazole 20 mg daily. Admits to some nasal congestion but denies any other significant symptoms other than throat clearing denies cough. He does admit to sometimes having some sinus pressure in the forehead and was told at one point that he has sinusitis. Currently on no allergy meds     6/7/25 a month ago I started him on Claritin-D Singulair Astelin nasal spray and doubled his omeprazole to 20 mg twice daily with 100% resolution in his throat discomfort.  No longer having any significant issues with congestion or throat pain.  He has altered his diet and improved at by limiting use of caffeine and fatty foods.  Wishes to discuss use of medications.  Seems to have been bothered by the use of montelukast as it caused him to feel drowsy throughout the day and even have somewhat of a headache in the mornings.  Did not really use the Claritin-D.      Social Hx on file[1]    Family History[2]    Past Medical History[3]    Past Surgical History[4]      REVIEW OF SYSTEMS    System Neg/Pos Details    Constitutional Negative Fatigue, fever and weight loss.   ENMT Negative Drooling.   Eyes Negative Blurred vision and vision changes.   Respiratory Negative Dyspnea and wheezing.   Cardio Negative Chest pain, irregular heartbeat/palpitations and syncope.   GI Negative Abdominal pain and diarrhea.   Endocrine Negative Cold intolerance and heat intolerance.   Neuro Negative Tremors.   Psych Negative Anxiety and depression.   Integumentary Negative Frequent skin infections, pigment change and rash.   Hema/Lymph Negative Easy bleeding and easy bruising.           PHYSICAL EXAM    There were no vitals taken for this visit.       Constitutional Normal Overall appearance - Normal.   Psychiatric Normal Orientation - Oriented to time, place, person & situation. Appropriate mood and affect.   Neck Exam Normal Inspection - Normal. Palpation - Normal. Parotid gland - Normal. Thyroid gland - Normal.   Eyes Normal Conjunctiva - Right: Normal, Left: Normal. Pupil - Right: Normal, Left: Normal. Fundus - Right: Normal, Left: Normal.   Neurological Normal Memory - Normal. Cranial nerves - Cranial nerves II through XII grossly intact.   Head/Face Normal Facial features - Normal. Eyebrows - Normal. Skull - Normal.        Nasopharynx Normal External nose - Normal. Lips/teeth/gums - Normal. Tonsils - Normal. Oropharynx - Normal.   Ears Normal Inspection - Right: Normal, Left: Normal. Canal - Right: Normal, Left: Normal. TM - Right: Normal, Left: Normal.   Skin Normal Inspection - Normal.        Lymph Detail Normal Submental. Submandibular. Anterior cervical. Posterior cervical. Supraclavicular.        Nose/Mouth/Throat Normal External nose - Normal. Lips/teeth/gums - Normal. Tonsils - Normal. Oropharynx - Normal.   Nose/Mouth/Throat Normal Nares - Right: Normal Left: Normal. Septum -Normal  Turbinates - Right: Normal, Left: Normal.     Medications - Current[5]  ASSESSMENT AND PLAN    1. Throat pain  I have asked him to back down on the  omeprazole to 20 mg daily instead of twice daily as he was doing prior to meeting with me a month ago.  In addition he will stop the Singulair as this was apparently impacting on his sleep and making him very drowsy throughout the day.  I have asked him to continue with the use of the Astelin nasal spray as needed and to wean as tolerated.  Return to see me as needed in the future or in 1 year for refills on any meds.        This note was prepared using Dragon Medical voice recognition dictation software. As a result errors may occur. When identified these errors have been corrected. While every attempt is made to correct errors during dictation discrepancies may still exist    Gary Hardwick MD    6/7/2025    7:47 AM         [1]   Social History  Socioeconomic History    Marital status:    Tobacco Use    Smoking status: Former     Types: Cigarettes    Smokeless tobacco: Never    Tobacco comments:     QUIT 5 YRS AGO   Vaping Use    Vaping status: Never Used   Substance and Sexual Activity    Alcohol use: Yes     Comment: on occ     Drug use: Never   [2] History reviewed. No pertinent family history.  [3]   Past Medical History:   GERD (gastroesophageal reflux disease)    High blood pressure   [4]   Past Surgical History:  Procedure Laterality Date    Colonoscopy N/A 11/17/2023    Procedure: COLONOSCOPY;  Surgeon: Duc Bonilla MD;  Location: [facility] ENDO    Egd      last EGD 3 years ago    Hernia surgery      Other      shoulder surgery    Upper gi endoscopy,exam     [5]   Current Outpatient Medications:     ergocalciferol 1.25 MG (86928 UT) Oral Cap, Take 1 capsule (50,000 Units total) by mouth once a week., Disp: , Rfl:     valsartan 80 MG Oral Tab, Take 1 tablet (80 mg total) by mouth daily., Disp: , Rfl:     montelukast 10 MG Oral Tab, Take 1 tablet (10 mg total) by mouth nightly., Disp: 30 tablet, Rfl: 3    loratadine-pseudoephedrine ER 5-120 MG Oral Tablet 12 Hr, Take 1 tablet by mouth every 12  (twelve) hours., Disp: 60 tablet, Rfl: 3    azelastine 0.1 % Nasal Solution, 2 sprays by Nasal route 2 (two) times daily., Disp: 30 mL, Rfl: 3    LOSARTAN POTASSIUM OR, Take by mouth., Disp: , Rfl:     OMEPRAZOLE 20 MG Oral Capsule Delayed Release, TAKE 1 CAPSULE(20 MG) BY MOUTH EVERY MORNING, Disp: 30 capsule, Rfl: 5    OMEPRAZOLE 20 MG Oral Capsule Delayed Release, TAKE 1 CAPSULE(20 MG) BY MOUTH EVERY MORNING, Disp: 30 capsule, Rfl: 3    Cholecalciferol (VITAMIN D3) 1000 units Oral Cap, , Disp: , Rfl:     ibuprofen 800 MG Oral Tab, 1 TABLET EVERY 8 HOURS FOR PAIN AS NEEDED, Disp: , Rfl: 0

## 2025-06-17 ENCOUNTER — HOSPITAL ENCOUNTER (OUTPATIENT)
Age: 50
Discharge: HOME | End: 2025-06-17
Payer: COMMERCIAL

## 2025-06-17 VITALS
HEART RATE: 89 BPM | OXYGEN SATURATION: 98 % | RESPIRATION RATE: 18 BRPM | TEMPERATURE: 98 F | DIASTOLIC BLOOD PRESSURE: 94 MMHG | SYSTOLIC BLOOD PRESSURE: 151 MMHG

## 2025-06-17 DIAGNOSIS — S05.02XA ABRASION OF LEFT CORNEA, INITIAL ENCOUNTER: Primary | ICD-10-CM

## 2025-06-17 PROCEDURE — 99203 OFFICE O/P NEW LOW 30 MIN: CPT | Performed by: PHYSICIAN ASSISTANT

## 2025-06-17 RX ORDER — KETOROLAC TROMETHAMINE 5 MG/ML
1 SOLUTION OPHTHALMIC 4 TIMES DAILY
Qty: 5 ML | Refills: 0 | Status: SHIPPED | OUTPATIENT
Start: 2025-06-17

## 2025-06-17 RX ORDER — ERYTHROMYCIN 5 MG/G
1 OINTMENT OPHTHALMIC EVERY 6 HOURS
Qty: 1 G | Refills: 0 | Status: SHIPPED | OUTPATIENT
Start: 2025-06-17 | End: 2025-06-17

## 2025-06-17 RX ORDER — ERYTHROMYCIN 5 MG/G
1 OINTMENT OPHTHALMIC EVERY 6 HOURS
Qty: 1 G | Refills: 0 | Status: SHIPPED | OUTPATIENT
Start: 2025-06-17 | End: 2025-06-22

## 2025-06-17 NOTE — ED INITIAL ASSESSMENT (HPI)
Pt reports he was cutting plywood and a piece of wood went into his left eye which happened a few hours PTA.

## 2025-06-17 NOTE — ED PROVIDER NOTES
Chief Complaint   Patient presents with    Foreign Body in Eye       History obtained from: patient, family member at bedside    services used    HPI:     Carlitos Lewis is a 49 year old male who presents with foreign body sensation to left eye. Patient states he was cutting a piece of plywood with a handsaw at work a few hours ago when a piece of wood flew up into his eye. Patient notes foreign body sensation in his left eye every time he blinks. Denies vision changes, eye discharge or bleeding, pain with eye movements, pain or swelling around the eye. Patient states he does not wear glasses or contacts.     PMH  Past Medical History[1]    [facility] as[facility] screens reviewed and agree.  Nurses notes reviewed I agree with documentation.    Family History[2]  Family history reviewed with patient/caregiver and is not pertinent to presenting problem.  Social History     Socioeconomic History    Marital status:      Spouse name: Not on file    Number of children: Not on file    Years of education: Not on file    Highest education level: Not on file   Occupational History    Not on file   Tobacco Use    Smoking status: Former     Types: Cigarettes    Smokeless tobacco: Never    Tobacco comments:     QUIT 5 YRS AGO   Vaping Use    Vaping status: Never Used   Substance and Sexual Activity    Alcohol use: Yes     Comment: on occ     Drug use: Never    Sexual activity: Not on file   Other Topics Concern    Not on file   Social History Narrative    Not on file     Social Drivers of Health     Food Insecurity: Not on file   Transportation Needs: Not on file   Housing Stability: Not on file         ROS:   Positive for stated complaint: left eye pain   Other systems are as noted in HPI.   All other systems reviewed and negative except as noted above.    Physical Exam:   Vital signs and nursing note reviewed.       BP (!) 151/94   Pulse 89   Temp 98.1 °F (36.7 °C) (Oral)   Resp 18   SpO2 98%     GENERAL:  well developed, no acute distress, non-toxic appearing   SKIN: good skin turgor, no obvious rashes  HEAD: normocephalic, atraumatic  EYES: left conjunctival injection, lids normal, PERRLA, EOMs intact without pain, no periorbital edema or erythema or tenderness, vision grossly intact, moderate size corneal abrasion to superior left cornea, no foreign body on lid eversion   OROPHARYNX: MMM, maintaining airway and secretions  NECK: no nuchal rigidity, no trismus, no edema, phonation normal    CARDIO: regular rate  LUNGS: no increased WOB  EXTREMITIES: no cyanosis or edema, LIM without difficulty  NEURO: no focal deficits  PSYCH: alert and oriented x3, answering questions appropriately, mood appropriate    MDM/Assessment/Plan:   Orders for this encounter:    Orders Placed This Encounter    erythromycin 5 MG/GM Ophthalmic Ointment     Sig: Apply 1 Application to eye every 6 (six) hours for 5 days.     Dispense:  1 g     Refill:  0    ketorolac 0.5 % Ophthalmic Solution     Sig: Place 1 drop into the left eye 4 (four) times daily.     Dispense:  5 mL     Refill:  0       Labs performed this visit:  No results found for this or any previous visit (from the past 10 hours).    Imaging performed this visit:  No orders to display       Medical Decision Making  DDx includes corneal abrasion versus traumatic conjunctivitis versus corneal foreign body versus other.  Patient is overall well-appearing with elevated blood pressure reading and otherwise stable vitals presenting with foreign body sensation to left eye.  Left eye examined with tetracaine and fluorescein using penlight.  No foreign bodies visualized however there is a moderate sized corneal abrasion to the superior left eye.  Left eye irrigated with saline.  Rx erythromycin eye ointment and ketorolac eyedrops.  Discussed supportive care including rest, avoiding touching eye, and OTC Tylenol/Advil as needed for pain.  Instructed patient to go directly to nearest ER with  any worsening or concerning symptoms.  Follow-up with ophthalmology, patient was able to schedule an appointment with their eye doctor within 2 days.    Patient was found to have elevated blood pressure reading today in IC. Denies headache, vision changes, speech changes, paresthesias, weakness, chest pain, shortness of breath, nausea, vomiting.  Recommend patient follow-up with PCP regarding elevated blood pressure reading.    Risk  OTC drugs.  Prescription drug management.          Diagnosis:    ICD-10-CM    1. Abrasion of left cornea, initial encounter  S05.02XA           All results reviewed and discussed with patient/patient's family. Patient/patient's family verbalize excellent understanding of instructions and feels comfortable with plan. All of patient's/patient's family's questions were addressed.   See AVS for detailed discharge instructions for your condition today.    Follow Up with:  Magdiel Flores MD  [facility] [address]  [address]  [facility] [address]  [phone]      Ophthalmology      Note: This document was dictated using Dragon medical dictation software.  Proofreading was performed to the best of my ability, but errors may be present.    Sharri Perez PA-C         [1]   Past Medical History:   GERD (gastroesophageal reflux disease)    High blood pressure   [2] No family history on file.

## 2025-06-17 NOTE — DISCHARGE INSTRUCTIONS
Apply antibiotic ointment to left eye as directed   Apply eye drop as needed up to 4 times daily for pain     Apply clean, warm compress to eyes a few times daily as tolerated   Avoid touching or putting anything else in or near eyes     Follow up with ophthalmology     If you experience severe/worsening symptoms, vision changes, eye pain or pain with eye movements, swelling or redness around the eye, fevers, or any other concerning symptoms, go directly to nearest ER with any worsening or concerning symptoms

## (undated) DEVICE — CONMED SCOPE SAVER BITE BLOCK, 20X27 MM: Brand: SCOPE SAVER

## (undated) DEVICE — YANKAUER SUCTION INSTRUMENT NO CONTROL VENT, BULB TIP, CLEAR: Brand: YANKAUER

## (undated) DEVICE — Device: Brand: DUAL NARE NASAL CANNULAE FEMALE LUER CON 7FT O2 TUBE

## (undated) DEVICE — LINE MNTR ADLT SET O2 INTMD

## (undated) DEVICE — Device: Brand: CUSTOM PROCEDURE KIT

## (undated) DEVICE — KIT CLEAN ENDOKIT 1.1OZ GOWNX2

## (undated) DEVICE — 60 ML SYRINGE REGULAR TIP: Brand: MONOJECT

## (undated) DEVICE — MEDI-VAC NON-CONDUCTIVE SUCTION TUBING 6MM X 1.8M (6FT.) L: Brand: CARDINAL HEALTH

## (undated) DEVICE — KIT ENDO ORCAPOD 160/180/190

## (undated) DEVICE — FORCEP RADIAL JAW 4

## (undated) DEVICE — 35 ML SYRINGE REGULAR TIP: Brand: MONOJECT

## (undated) DEVICE — Device: Brand: DEFENDO AIR/WATER/SUCTION AND BIOPSY VALVE

## (undated) DEVICE — MEDI-VAC NON-CONDUCTIVE SUCTION TUBING: Brand: CARDINAL HEALTH

## (undated) DEVICE — FORCEPS BX L240CM DIA2.4MM L NDL RAD JAW 4

## (undated) DEVICE — SNARE ENDOSCOPIC 10MM ROUND

## (undated) DEVICE — SNARE OPTMZ PLPCTM TRP

## (undated) NOTE — LETTER
[address] [facility] [address] [facility] [place]  Authorization for Surgical Operation and Procedure                                                                                           I hereby authorize Destiny Bolanos MD, my physician and his/her assistants (if applicable), which may include medical students, residents, and/or fellows, to perform the following surgical operation/ procedure and administer such anesthesia as may be determined necessary by my physician: Operation/Procedure name (s) COLONOSCOPY/ESOPHAGOGASTRODUODENOSCOPY on [address] [facility]   2. I recognize that during the surgical operation/procedure, unforeseen conditions may necessitate additional or different procedures than those listed above. I, therefore, further authorize and request that the above-named surgeon, assistants, or designees perform such procedures as are, in their judgment, necessary and desirable. 3.   My surgeon/physician has discussed prior to my surgery the potential benefits, risks and side effects of this procedure; the likelihood of achieving goals; and potential problems that might occur during recuperation. They also discussed reasonable alternatives to the procedure, including risks, benefits, and side effects related to the alternatives and risks related to not receiving this procedure. I have had all my questions answered and I acknowledge that no guarantee has been made as to the result that may be obtained. 4.   Should the need arise during my operation/procedure, which includes change of level of care prior to discharge, I also consent to the administration of blood and/or blood products. Further, I understand that despite careful testing and screening of blood or blood products by collecting agencies, I may still be subject to ill effects as a result of receiving a blood transfusion and/or blood products.   The following are some, but not all, of the potential risks that can occur: fever and allergic reactions, hemolytic reactions, transmission of diseases such as Hepatitis, AIDS and Cytomegalovirus (CMV) and fluid overload. In the event that I wish to have an autologous transfusion of my own blood, or a directed donor transfusion, I will discuss this with my physician. Check only if Refusing Blood or Blood Products  I understand refusal of blood or blood products as deemed necessary by my physician may have serious consequences to my condition to include possible death. I hereby assume responsibility for my refusal and release the hospital, its personnel, and my physicians from any responsibility for the consequences of my refusal.    o  Refuse   5. I authorize the use of any specimen, organs, tissues, body parts or foreign objects that may be removed from my body during the operation/procedure for diagnosis, research or teaching purposes and their subsequent disposal by hospital authorities. I also authorize the release of specimen test results and/or written reports to my treating physician on the hospital medical staff or other referring or consulting physicians involved in my care, at the discretion of the Pathologist or my treating physician. 6.   I consent to the photographing or videotaping of the operations or procedures to be performed, including appropriate portions of my body for medical, scientific, or educational purposes, provided my identity is not revealed by the pictures or by descriptive texts accompanying them. If the procedure has been photographed/videotaped, the surgeon will obtain the original picture, image, videotape or CD. The hospital will not be responsible for storage, release or maintenance of the picture, image, tape or CD.    7.   I consent to the presence of a  or observers in the operating room as deemed necessary by my physician or their designees.     8.   I recognize that in the event my procedure results in extended X-Ray/fluoroscopy time, I may develop a skin reaction. 9. If I have a Do Not Attempt Resuscitation (DNAR) order in place, that status will be suspended while in the operating room, procedural suite, and during the recovery period unless otherwise explicitly stated by me (or a person authorized to consent on my behalf). The surgeon or my attending physician will determine when the applicable recovery period ends for purposes of reinstating the DNAR order. 10. Patients having a sterilization procedure: I understand that if the procedure is successful the results will be permanent and it will therefore be impossible for me to inseminate, conceive, or bear children. I also understand that the procedure is intended to result in sterility, although the result has not been guaranteed. 11. I acknowledge that my physician has explained sedation/analgesia administration to me including the risk and benefits I consent to the administration of sedation/analgesia as may be necessary or desirable in the judgment of my physician. I CERTIFY THAT I HAVE READ AND FULLY UNDERSTAND THE ABOVE CONSENT TO OPERATION and/or OTHER PROCEDURE.     _________________________________________ _________________________________     ___________________________________  Signature of Patient     Signature of Responsible Person                   Printed Name of Responsible Person                              _________________________________________ ______________________________        ___________________________________  Signature of Witness         Date  Time         Relationship to Patient    STATEMENT OF PHYSICIAN My signature below affirms that prior to the time of the procedure; I have explained to the patient and/or his/her legal representative, the risks and benefits involved in the proposed treatment and any reasonable alternative to the proposed treatment.  I have also explained the risks and benefits involved in refusal of the proposed treatment and alternatives to the proposed treatment and have answered the patient's questions.  If I have a significant financial interest in a co-management agreement or a significant financial interest in any product or implant, or other significant relationship used in this procedure/surgery, I have disclosed this and had a discussion with my patient.     _______________________________________________________________ _____________________________  Shaniqua Avalos of Physician)                                                                                         (Date)                                   (Time)  Patient Name: Jeffery Pleitez    : 1975   Printed: 11/15/2023      Medical Record #: [id number]                                              Page 1 of 1

## (undated) NOTE — LETTER
No referring provider defined for this encounter.       05/17/25        Patient: Carlitos Lewis   YOB: 1975   Date of Visit: 5/17/2025       Dear  Dr. Nancy MD,      Thank you for referring Carlitos Lewis to my practice.  Please find my assessment and plan below.    ASSESSMENT AND PLAN    1. Throat pain  Laryngoscopy reveals significant erythema of the laryngeal mucosa but no lesions masses polyps nodules or other issues.  GERD and postnasal discharge is the major cause of his throat symptoms.  I have asked him to double his omeprazole to 20 mg twice daily as he does continue to have symptoms occasionally with certain diets and when he is lying flat.  In addition I will start him on Claritin-D Singulair Astelin nasal spray for postnasal discharge noted on examination.  Return to see me in 1 month and at that time we will wean his meds as tolerated.  I did reassure him that no lesions or other abnormalities are present.               Sincerely,   Gary Hardwick MD   [facility], [address] [address]    Document electronically generated by:  Gary Hardwick MD